# Patient Record
Sex: MALE | Race: WHITE | Employment: FULL TIME | URBAN - METROPOLITAN AREA
[De-identification: names, ages, dates, MRNs, and addresses within clinical notes are randomized per-mention and may not be internally consistent; named-entity substitution may affect disease eponyms.]

---

## 2023-01-29 ENCOUNTER — HOSPITAL ENCOUNTER (EMERGENCY)
Age: 53
Discharge: LEFT BEFORE DISCHARGE | End: 2023-01-29
Attending: EMERGENCY MEDICINE
Payer: COMMERCIAL

## 2023-01-29 ENCOUNTER — APPOINTMENT (OUTPATIENT)
Dept: GENERAL RADIOLOGY | Age: 53
End: 2023-01-29
Payer: COMMERCIAL

## 2023-01-29 VITALS
OXYGEN SATURATION: 96 % | WEIGHT: 230 LBS | RESPIRATION RATE: 14 BRPM | HEIGHT: 71 IN | DIASTOLIC BLOOD PRESSURE: 95 MMHG | TEMPERATURE: 97.8 F | BODY MASS INDEX: 32.2 KG/M2 | HEART RATE: 112 BPM | SYSTOLIC BLOOD PRESSURE: 130 MMHG

## 2023-01-29 DIAGNOSIS — A41.9 SEPTICEMIA (HCC): ICD-10-CM
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
## 2023-01-29 DIAGNOSIS — L03.012 CELLULITIS OF FINGER OF LEFT HAND: Primary | ICD-10-CM

## 2023-01-29 DIAGNOSIS — Z53.29 LEFT AGAINST MEDICAL ADVICE: ICD-10-CM

## 2023-01-29 DIAGNOSIS — D72.825 BANDEMIA: ICD-10-CM

## 2023-01-29 LAB
A/G RATIO: 1.5 (ref 1.1–2.2)
ALBUMIN SERPL-MCNC: 4.4 G/DL (ref 3.4–5)
ALP BLD-CCNC: 87 U/L (ref 40–129)
ALT SERPL-CCNC: 24 U/L (ref 10–40)
ANION GAP SERPL CALCULATED.3IONS-SCNC: 10 MMOL/L (ref 3–16)
AST SERPL-CCNC: 19 U/L (ref 15–37)
BANDED NEUTROPHILS RELATIVE PERCENT: 16 % (ref 0–7)
BASOPHILS ABSOLUTE: 0 K/UL (ref 0–0.2)
BASOPHILS RELATIVE PERCENT: 0 %
BILIRUB SERPL-MCNC: 1 MG/DL (ref 0–1)
BUN BLDV-MCNC: 9 MG/DL (ref 7–20)
C-REACTIVE PROTEIN: 203.3 MG/L (ref 0–5.1)
CALCIUM SERPL-MCNC: 9.5 MG/DL (ref 8.3–10.6)
CHLORIDE BLD-SCNC: 98 MMOL/L (ref 99–110)
CO2: 25 MMOL/L (ref 21–32)
CREAT SERPL-MCNC: 0.8 MG/DL (ref 0.9–1.3)
EOSINOPHILS ABSOLUTE: 0 K/UL (ref 0–0.6)
EOSINOPHILS RELATIVE PERCENT: 0 %
GFR SERPL CREATININE-BSD FRML MDRD: >60 ML/MIN/{1.73_M2}
GLUCOSE BLD-MCNC: 224 MG/DL (ref 70–99)
HCT VFR BLD CALC: 45.9 % (ref 40.5–52.5)
HEMOGLOBIN: 15.6 G/DL (ref 13.5–17.5)
LACTIC ACID: 1.1 MMOL/L (ref 0.4–2)
LYMPHOCYTES ABSOLUTE: 1 K/UL (ref 1–5.1)
LYMPHOCYTES RELATIVE PERCENT: 6 %
MCH RBC QN AUTO: 30.7 PG (ref 26–34)
MCHC RBC AUTO-ENTMCNC: 34 G/DL (ref 31–36)
MCV RBC AUTO: 90.3 FL (ref 80–100)
MONOCYTES ABSOLUTE: 0.8 K/UL (ref 0–1.3)
MONOCYTES RELATIVE PERCENT: 5 %
NEUTROPHILS ABSOLUTE: 14.5 K/UL (ref 1.7–7.7)
NEUTROPHILS RELATIVE PERCENT: 73 %
PDW BLD-RTO: 13.4 % (ref 12.4–15.4)
PLATELET # BLD: 106 K/UL (ref 135–450)
PMV BLD AUTO: 10.4 FL (ref 5–10.5)
POTASSIUM SERPL-SCNC: 3.8 MMOL/L (ref 3.5–5.1)
RBC # BLD: 5.08 M/UL (ref 4.2–5.9)
SEDIMENTATION RATE, ERYTHROCYTE: 41 MM/HR (ref 0–20)
SODIUM BLD-SCNC: 133 MMOL/L (ref 136–145)
TOTAL CK: 102 U/L (ref 39–308)
TOTAL PROTEIN: 7.3 G/DL (ref 6.4–8.2)
WBC # BLD: 16.3 K/UL (ref 4–11)

## 2023-01-29 PROCEDURE — 6360000002 HC RX W HCPCS: Performed by: EMERGENCY MEDICINE

## 2023-01-29 PROCEDURE — 83605 ASSAY OF LACTIC ACID: CPT

## 2023-01-29 PROCEDURE — 80053 COMPREHEN METABOLIC PANEL: CPT

## 2023-01-29 PROCEDURE — 6370000000 HC RX 637 (ALT 250 FOR IP): Performed by: EMERGENCY MEDICINE

## 2023-01-29 PROCEDURE — 2580000003 HC RX 258: Performed by: EMERGENCY MEDICINE

## 2023-01-29 PROCEDURE — 85025 COMPLETE CBC W/AUTO DIFF WBC: CPT

## 2023-01-29 PROCEDURE — 90471 IMMUNIZATION ADMIN: CPT | Performed by: EMERGENCY MEDICINE

## 2023-01-29 PROCEDURE — 85652 RBC SED RATE AUTOMATED: CPT

## 2023-01-29 PROCEDURE — 73140 X-RAY EXAM OF FINGER(S): CPT

## 2023-01-29 PROCEDURE — 99284 EMERGENCY DEPT VISIT MOD MDM: CPT

## 2023-01-29 PROCEDURE — 87040 BLOOD CULTURE FOR BACTERIA: CPT

## 2023-01-29 PROCEDURE — 2500000003 HC RX 250 WO HCPCS: Performed by: EMERGENCY MEDICINE

## 2023-01-29 PROCEDURE — 82550 ASSAY OF CK (CPK): CPT

## 2023-01-29 PROCEDURE — 26010 DRAINAGE OF FINGER ABSCESS: CPT

## 2023-01-29 PROCEDURE — 90715 TDAP VACCINE 7 YRS/> IM: CPT | Performed by: EMERGENCY MEDICINE

## 2023-01-29 PROCEDURE — 96365 THER/PROPH/DIAG IV INF INIT: CPT

## 2023-01-29 PROCEDURE — 96375 TX/PRO/DX INJ NEW DRUG ADDON: CPT

## 2023-01-29 PROCEDURE — 86140 C-REACTIVE PROTEIN: CPT

## 2023-01-29 RX ORDER — SULFAMETHOXAZOLE AND TRIMETHOPRIM 800; 160 MG/1; MG/1
1 TABLET ORAL ONCE
Status: COMPLETED | OUTPATIENT
Start: 2023-01-29 | End: 2023-01-29

## 2023-01-29 RX ORDER — ACETAMINOPHEN 325 MG/1
650 TABLET ORAL ONCE
Status: COMPLETED | OUTPATIENT
Start: 2023-01-29 | End: 2023-01-29

## 2023-01-29 RX ORDER — 0.9 % SODIUM CHLORIDE 0.9 %
1000 INTRAVENOUS SOLUTION INTRAVENOUS ONCE
Status: COMPLETED | OUTPATIENT
Start: 2023-01-29 | End: 2023-01-29

## 2023-01-29 RX ORDER — CLINDAMYCIN PHOSPHATE 600 MG/50ML
600 INJECTION INTRAVENOUS ONCE
Status: COMPLETED | OUTPATIENT
Start: 2023-01-29 | End: 2023-01-29

## 2023-01-29 RX ORDER — CLINDAMYCIN HYDROCHLORIDE 300 MG/1
300 CAPSULE ORAL 3 TIMES DAILY
Qty: 24 CAPSULE | Refills: 0 | Status: SHIPPED | OUTPATIENT
Start: 2023-01-29 | End: 2023-01-29 | Stop reason: SDUPTHER

## 2023-01-29 RX ORDER — ONDANSETRON 4 MG/1
4 TABLET, ORALLY DISINTEGRATING ORAL ONCE
Status: COMPLETED | OUTPATIENT
Start: 2023-01-29 | End: 2023-01-29

## 2023-01-29 RX ORDER — CLINDAMYCIN HYDROCHLORIDE 300 MG/1
300 CAPSULE ORAL 3 TIMES DAILY
Qty: 24 CAPSULE | Refills: 0 | Status: SHIPPED | OUTPATIENT
Start: 2023-01-29 | End: 2023-02-06

## 2023-01-29 RX ORDER — SULFAMETHOXAZOLE AND TRIMETHOPRIM 800; 160 MG/1; MG/1
1 TABLET ORAL 2 TIMES DAILY
Qty: 16 TABLET | Refills: 0 | Status: SHIPPED | OUTPATIENT
Start: 2023-01-29 | End: 2023-01-29 | Stop reason: SDUPTHER

## 2023-01-29 RX ORDER — SULFAMETHOXAZOLE AND TRIMETHOPRIM 800; 160 MG/1; MG/1
1 TABLET ORAL 2 TIMES DAILY
Qty: 16 TABLET | Refills: 0 | Status: SHIPPED | OUTPATIENT
Start: 2023-01-29 | End: 2023-02-06

## 2023-01-29 RX ORDER — KETOROLAC TROMETHAMINE 30 MG/ML
15 INJECTION, SOLUTION INTRAMUSCULAR; INTRAVENOUS ONCE
Status: COMPLETED | OUTPATIENT
Start: 2023-01-29 | End: 2023-01-29

## 2023-01-29 RX ADMIN — SODIUM CHLORIDE 1000 ML: 9 INJECTION, SOLUTION INTRAVENOUS at 06:55

## 2023-01-29 RX ADMIN — CLINDAMYCIN PHOSPHATE 600 MG: 600 INJECTION, SOLUTION INTRAVENOUS at 06:58

## 2023-01-29 RX ADMIN — ONDANSETRON 4 MG: 4 TABLET, ORALLY DISINTEGRATING ORAL at 06:55

## 2023-01-29 RX ADMIN — KETOROLAC TROMETHAMINE 15 MG: 30 INJECTION, SOLUTION INTRAMUSCULAR at 06:56

## 2023-01-29 RX ADMIN — ACETAMINOPHEN 325MG 650 MG: 325 TABLET ORAL at 06:58

## 2023-01-29 RX ADMIN — SULFAMETHOXAZOLE AND TRIMETHOPRIM 1 TABLET: 800; 160 TABLET ORAL at 06:58

## 2023-01-29 RX ADMIN — TETANUS TOXOID, REDUCED DIPHTHERIA TOXOID AND ACELLULAR PERTUSSIS VACCINE, ADSORBED 0.5 ML: 5; 2.5; 8; 8; 2.5 SUSPENSION INTRAMUSCULAR at 06:59

## 2023-01-29 ASSESSMENT — PAIN DESCRIPTION - LOCATION
LOCATION: HAND
LOCATION: HAND

## 2023-01-29 ASSESSMENT — PAIN SCALES - GENERAL
PAINLEVEL_OUTOF10: 4
PAINLEVEL_OUTOF10: 7
PAINLEVEL_OUTOF10: 6

## 2023-01-29 ASSESSMENT — PAIN DESCRIPTION - ORIENTATION
ORIENTATION: LEFT
ORIENTATION: LEFT

## 2023-01-29 ASSESSMENT — PAIN DESCRIPTION - DESCRIPTORS
DESCRIPTORS: ACHING

## 2023-01-29 ASSESSMENT — PAIN - FUNCTIONAL ASSESSMENT
PAIN_FUNCTIONAL_ASSESSMENT: 0-10
PAIN_FUNCTIONAL_ASSESSMENT: 0-10

## 2023-01-29 NOTE — DISCHARGE INSTRUCTIONS
We did recommend admission but at this time you are refusing. If you change your mind or have any worsening symptoms with redness spreading, severe pain with any movement of the finger, fever, confusion, or any other concerns over the next 6 to 24 hours, then return immediately to the ED for repeat assessment. Otherwise, make sure to follow-up with orthopedics in the next 1 to 2 days for repeat evaluation and call primary doctor for any other concerns. Do not return to work until cleared by orthopedics. Take Bactrim and clindamycin as prescribed due to concern for infection. Take Tylenol or Motrin as needed for pain. Drink plenty of fluids.

## 2023-01-29 NOTE — Clinical Note
Stephanie Giordano was seen and treated in our emergency department on 1/29/2023. He may return to work on 02/01/2023. Patient will need clearance by orthopedics prior to returning to work due to concern for left middle finger infection     If you have any questions or concerns, please don't hesitate to call.       Armand Barboza MD

## 2023-01-29 NOTE — CONSULTS
65 - PS to Dr Rena Puckett at [facility] ortho  Re: left middle finger infection  0827 - Dr Rena Puckett called back to speak with Dr Miryam Bowman

## 2023-01-29 NOTE — ED NOTES
Pt signed out [place] after speaking with Dr Eneida Campos. Pt signed AMA form, RN reviewed AVS with pt and handed him 2 paper prescriptions. Pt denies concerns or questions at this time.       Margot Rose RN  01/29/23 1014

## 2023-01-29 NOTE — ED NOTES
Bacitracin, Adaptic, Gauze 4 by 4 pads, and gauze stretch wrap applied to injured finger.      Lynda Eastman RN  01/29/23 9856

## 2023-01-29 NOTE — ED PROVIDER NOTES
[facility]  ED  EMERGENCY DEPARTMENT ENCOUNTER        Pt Name: Mary Royal  MRN: [id number]  [place] 1970  Date of evaluation: 1/29/2023  Provider: Dano Serrano MD  PCP: No primary care provider on file. CHIEF COMPLAINT       Chief Complaint   Patient presents with    Finger Injury     L middle finger, happened at work on Weds, red, swollen, 7/10. HISTORY OFPRESENT ILLNESS   (Location/Symptom, Timing/Onset, Context/Setting, Quality, Duration, Modifying Factors,Severity)  Note limiting factors. Mary Royal is a 45 y.o. male presenting today due to concern for sustaining a small cut to the ulnar aspect of his left distal middle finger 3 days ago on Thursday at work and stating that it seemed to be a very small laceration with nothing to worry about until he started developing pain this morning when he woke up associated with swelling and redness. He does report still being able to move the finger but does have some discomfort with doing so. He does report some pain to the left hand and a slight discomfort in his left forearm as well. He is unsure of his last tetanus shot. He is right-handed. No chest pain or shortness of breath. No nausea or vomiting. No trouble moving the wrist or elbow. Due to concern for worsening left middle finger pain, he came to the emergency department for antibiotics. He did drive here. He denies any numbness or weakness in the hand other than in regards to discomfort with moving the left middle finger. REVIEW OF SYSTEMS    (2-9 systems for level 4, 10 or more for level 5)     Review of Systems      Positives and Pertinent negatives as per HPI. PASTMEDICAL HISTORY   History reviewed. No pertinent past medical history.       SURGICAL HISTORY       Past Surgical History:   Procedure Laterality Date    MOUTH SURGERY  2006         CURRENT MEDICATIONS       Discharge Medication List as of 1/29/2023  9:59 AM ALLERGIES     Patient has no known allergies. FAMILY HISTORY     History reviewed. No pertinent family history. SOCIAL HISTORY       Social History     Socioeconomic History    Marital status: Single     Spouse name: None    Number of children: None    Years of education: None    Highest education level: None   Tobacco Use    Smoking status: Never    Smokeless tobacco: Never   Vaping Use    Vaping Use: Never used   Substance and Sexual Activity    Alcohol use: Yes     Comment: occ    Drug use: Yes     Types: Marijuana (Weed)       SCREENINGS    [place] Coma Scale  Eye Opening: Spontaneous  Best Verbal Response: Oriented  Best Motor Response: Obeys commands  [place] Coma Scale Score: 15           PHYSICAL EXAM    (up to 7 for level 4, 8 or more for level 5)     ED Triage Vitals [01/29/23 0548]   BP Temp Temp Source Heart Rate Resp SpO2 Height Weight   (!) 165/109 97.8 °F (36.6 °C) Oral (!) 115 18 93 % 5' 11\" (1.803 m) 230 lb (104.3 kg)       Physical Exam  Vitals and nursing note reviewed. Constitutional:       General: He is awake. He is not in acute distress. Appearance: Normal appearance. He is well-developed and well-groomed. He is obese. He is not ill-appearing, toxic-appearing or diaphoretic. HENT:      Head: Normocephalic and atraumatic. Mouth/Throat:      Mouth: Mucous membranes are moist.   Cardiovascular:      Rate and Rhythm: Regular rhythm. Tachycardia present. Pulses: Normal pulses. Radial pulses are 2+ on the right side and 2+ on the left side. Pulmonary:      Effort: Pulmonary effort is normal. No respiratory distress. Breath sounds: No stridor. Musculoskeletal:         General: Swelling (left middle finger and base of left middle finger), tenderness (left middle finger and distal left hand on the dorsum) and signs of injury (superficial laceration noted to ulnar aspect of left middle finger) present. No deformity.       Right forearm: Normal. Left forearm: Normal. No swelling, edema, deformity, lacerations, tenderness or bony tenderness. Right wrist: Normal.      Left wrist: Normal. No swelling, deformity, effusion, lacerations, tenderness, bony tenderness, snuff box tenderness or crepitus. Normal range of motion. Normal pulse. Right hand: No swelling, deformity, lacerations, tenderness or bony tenderness. Normal range of motion. Normal strength. Normal sensation. Left hand: Swelling (swelling to left middle finger), laceration (superficial, nothing needing repair at this time to left middle finger) and tenderness present. No deformity or bony tenderness. Decreased range of motion (minimally to left middle finger, no pain with passive extension/flexion noted on exam). Normal strength. Normal sensation. Normal capillary refill. Hands:       Cervical back: Neck supple. Skin:     General: Skin is warm and dry. Capillary Refill: Capillary refill takes less than 2 seconds. Left middle finger     Coloration: Skin is not jaundiced. Findings: Erythema present. No bruising. Neurological:      General: No focal deficit present. Mental Status: He is alert and oriented to person, place, and time. Mental status is at baseline. Sensory: No sensory deficit. Motor: No weakness. Psychiatric:         Attention and Perception: Attention normal.         Mood and Affect: Affect normal. Mood is anxious. Speech: Speech normal. Speech is not delayed or slurred. Behavior: Behavior normal. Behavior is cooperative.            DIAGNOSTIC RESULTS   :    Labs Reviewed   CBC WITH AUTO DIFFERENTIAL - Abnormal; Notable for the following components:       Result Value    WBC 16.3 (*)     Platelets 122 (*)     Neutrophils Absolute 14.5 (*)     Bands Relative 16 (*)     All other components within normal limits   COMPREHENSIVE METABOLIC PANEL - Abnormal; Notable for the following components:    Sodium 133 (*)     Chloride 98 (*)     Glucose 224 (*)     Creatinine 0.8 (*)     All other components within normal limits   SEDIMENTATION RATE - Abnormal; Notable for the following components:    Sed Rate 41 (*)     All other components within normal limits   C-REACTIVE PROTEIN - Abnormal; Notable for the following components:    .3 (*)     All other components within normal limits   CULTURE, BLOOD 1    Narrative:     ORDER#: [id number]                          ORDERED BY: Matt DE LA ROSA Payer  SOURCE: Blood                              COLLECTED:  01/29/23 06:50  ANTIBIOTICS AT OMAR.:                      RECEIVED :  01/29/23 16:57  If child <=2 yrs old please draw pediatric bottle. ~Blood Culture 1   CULTURE, BLOOD 2    Narrative:     ORDER#: [id number]                          ORDERED BY: Matt DE LA ROSA Payer  SOURCE: Blood Antecubital-Rig              COLLECTED:  01/29/23 06:52  ANTIBIOTICS AT OMAR.:                      RECEIVED :  01/29/23 16:57  If child <=2 yrs old please draw pediatric bottle. ~Blood Culture #2   LACTIC ACID   CK       All other labs were within normal range or not returned asof this dictation. EKG: All EKG's are interpreted by the Emergency Department Physician who either signs or Co-signs this chart in the absence of a cardiologist.        RADIOLOGY:   Non-plain film images such as CT, Ultrasound and MRI are read by the radiologist. Janit Canny images are visualized and preliminarily interpreted by the  ED Provider with the belowfindings:    I did review the finger x-ray and did not see any gas or obvious signs of bony involvement, I also reviewed radiologist's report. Interpretation per the Radiologist below, if available at the time of this note:    XR FINGER LEFT (MIN 2 VIEWS)   Final Result   No acute osseous abnormality.                PROCEDURES   Unless otherwise noted below, none     Incision/Drainage    Date/Time: 1/30/2023 11:12 PM  Performed by: Tod Cheung MD  Authorized by: Tod Cheung MD Consent:     Consent obtained:  Verbal    Consent given by:  Patient    Risks, benefits, and alternatives were discussed: yes      Risks discussed:  Bleeding, incomplete drainage, pain and infection    Alternatives discussed:  No treatment, referral and alternative treatment  Universal protocol:     Procedure explained and questions answered to patient or proxy's satisfaction: yes      Patient identity confirmed:  Verbally with patient  Location:     Type:  Abscess    Size:  1 cm    Location:  Upper extremity    Upper extremity location:  Finger    Finger location:  L long finger (ulnar aspect of left middle finger close to DIP region)  Pre-procedure details:     Skin preparation:  Povidone-iodine  Sedation:     Sedation type:  None  Anesthesia:     Anesthesia method:  Local infiltration    Local anesthetic:  Lidocaine 1% w/o epi  Procedure type:     Complexity:  Simple  Procedure details:     Ultrasound guidance: no      Needle aspiration: no      Incision types:  Single straight    Incision depth:  Dermal    Wound management:  Probed and deloculated    Drainage:  Purulent and bloody    Drainage amount:  Scant    Wound treatment:  Wound left open    Packing materials:  None  Post-procedure details:     Procedure completion:  Tolerated well, no immediate complications    CRITICAL CARE TIME   Critical Care Time:    I personally saw the patient and independently provided 40 minutes of non-concurrent critical care out of the total shared critical care time provided. Includes repeat examinations, speaking with consultants, lab and x-ray interpretation, charting, treating for severe finger infection needing IV antibiotics    Excludes separate billable procedures. Patient at risk for serious decompensation if not treated for this life-threatening/limb-threatening condition.             CONSULTS: Spoke with Dr. Yobani De Leon and he did review the patient's chart and imaging that the patient did give verbal consent for me to take a picture of his hand and ultimately stated that incision and drainage was warranted but no need for him to come in at this point since he does have good range of motion of the finger and hand and does not suspect tenosynovitis but would be happy to see the patient if he does agree to be admitted but otherwise would agree to have the patient be seen urgently in the office in the next 1 to 2 days by orthopedics. He recommended soaking the hand twice a day in soapy water along with using a pancake splint for comfort and elevate the arm when possible by the patient if he does decide to leave [address] IP CONSULT TO ORTHOPEDIC SURGERY    EMERGENCY DEPARTMENT COURSE and DIFFERENTIAL DIAGNOSIS/MDM:   Vitals:    Vitals:    01/29/23 0753 01/29/23 0823 01/29/23 0853 01/29/23 1010   BP: (!) 146/94 (!) 155/92 139/85 (!) 130/95   Pulse: 95 (!) 107 (!) 112 (!) 112   Resp: 16   14   Temp:    97.8 °F (36.6 °C)   TempSrc:    Oral   SpO2: 96% 95% 95% 96%   Weight:       Height:           Patient was given the following medications:  Medications   0.9 % sodium chloride bolus (0 mLs IntraVENous Stopped 1/29/23 0803)   clindamycin (CLEOCIN) 600 mg in dextrose 5 % 50 mL IVPB (0 mg IntraVENous Stopped 1/29/23 0728)   sulfamethoxazole-trimethoprim (BACTRIM DS;SEPTRA DS) 800-160 MG per tablet 1 tablet (1 tablet Oral Given 1/29/23 0658)   tetanus-diphth-acell pertussis (BOOSTRIX) injection 0.5 mL (0.5 mLs IntraMUSCular Given 1/29/23 0659)   ketorolac (TORADOL) injection 15 mg (15 mg IntraVENous Given 1/29/23 0656)   acetaminophen (TYLENOL) tablet 650 mg (650 mg Oral Given 1/29/23 0658)   ondansetron (ZOFRAN-ODT) disintegrating tablet 4 mg (4 mg Oral Given 1/29/23 1610)     Patient evaluated due to work-related injury in which point he had a superficial laceration to his left middle finger and now is complaining of pain starting this morning with erythema and swelling spreading towards the hand.   I am considering abscess, tenosynovitis, septic arthritis, cellulitis, sepsis, necrotizing fasciitis, amongst other pathology. He denies any chest pain or shortness of breath and even with the tachycardia, I do believe this is more related to anxiety along with pain and infection and do not suspect pulmonary embolism. He did drive and therefore I did try IV Toradol for the discomfort along with oral Tylenol. He received oral Bactrim along with IV clindamycin as well to see if this helps with his swelling and signs of infection since he reports he does not want to be admitted. Tdap was updated. Upon repeat evaluation after antibiotics, he did report that he was having him provement with his pain and able to move his finge with greater ease and still had good range of motion of all the joints of the left middle finger along with the left hand and wrist.  I did highly recommend admission with IV antibiotics due to concerning initial evaluation but he was declining admission. He had full mental capacity to make his own medical decisions and was alert and oriented x4. He is aware that if this worsens, he could ultimately lose a finger or even more of his hand or arm that could lead to permanent disability or even death if he is septic. Even with this knowledge, he was refusing admission and wanted to go home. He promises to return to the emergency department immediately for any worsening symptoms but otherwise agrees to follow-up with orthopedics within the next 1 to 2 days to ensure the infection is improving. He was well-appearing and in no acute distress but ultimately signed out [address] after informed discussion. I did prescribe him oral clindamycin and oral Bactrim to double cover him to hopefully avoid this from getting any worse, especially since he did report some improvement of his symptoms while in the emergency department following these antibiotics.   He did report this happened at work although at this time is not filing Worker's Compensation form. I was the primary provider for the patient. The patient tolerated their visit well. The patient and / or the family were informed of the results of any tests, a time was given to answer questions. FINAL IMPRESSION      1. Cellulitis of finger of left hand    2. Bandemia    3. Septicemia ([address]    4. Left against medical advice    5.  Work related injury          [address] [place] [address] 01/29/2023 09:54:45 AM      PATIENT REFERRED TO:  [facility]  ED  [address] [facility] [address] [facility]  Go to   If symptoms worsen    [facility]  [phone]  In 2 days      [address]  [phone]  Schedule an appointment as soon as possible for a visit in 1 day  For repeat evaluation with orthopedics in 1-2 days, call first thing in the morning tomorrow    DISCHARGEMEDICATIONS:  Discharge Medication List as of 1/29/2023  9:59 AM        START taking these medications    Details   clindamycin (CLEOCIN) 300 MG capsule Take 1 capsule by mouth 3 times daily for 8 days, Disp-24 capsule, R-0Print      sulfamethoxazole-trimethoprim (BACTRIM DS;SEPTRA DS) 800-160 MG per tablet Take 1 tablet by mouth 2 times daily for 8 days, Disp-16 tablet, R-0Print             DISCONTINUED MEDICATIONS:  Discharge Medication List as of 1/29/2023  9:59 AM                 (Please note that portions of this note were completed with a voicerecognition program.  Efforts were made to edit the dictations but occasionally words are mis-transcribed.)    Abigail Garcias MD (electronically signed)            Abigail Garcias MD  01/30/23 6083

## 2023-01-30 ENCOUNTER — TELEPHONE (OUTPATIENT)
Dept: ORTHOPEDIC SURGERY | Age: 53
End: 2023-01-30

## 2023-01-30 NOTE — TELEPHONE ENCOUNTER
Appointment Request     Patient requesting earlier appointment: Yes  Appointment offered to patient: 2/1/2023  Patient Contact Number: [phone]    Would prefer And location.

## 2023-01-30 NOTE — TELEPHONE ENCOUNTER
Tried to call patient back to explain [facility] schedule is full. No answer, VMB not set up. Patient already scheduled for an appointment this week.

## 2023-02-01 ENCOUNTER — OFFICE VISIT (OUTPATIENT)
Dept: ORTHOPEDIC SURGERY | Age: 53
End: 2023-02-01

## 2023-02-01 VITALS — WEIGHT: 230 LBS | BODY MASS INDEX: 32.2 KG/M2 | HEIGHT: 71 IN

## 2023-02-01 DIAGNOSIS — L08.9 FINGER INFECTION: Primary | ICD-10-CM

## 2023-02-01 PROCEDURE — 99203 OFFICE O/P NEW LOW 30 MIN: CPT | Performed by: ORTHOPAEDIC SURGERY

## 2023-02-01 RX ORDER — SULFAMETHOXAZOLE AND TRIMETHOPRIM 800; 160 MG/1; MG/1
1 TABLET ORAL 2 TIMES DAILY
Qty: 10 TABLET | Refills: 0 | Status: SHIPPED | OUTPATIENT
Start: 2023-02-01 | End: 2023-02-06

## 2023-02-01 RX ORDER — CLINDAMYCIN HYDROCHLORIDE 300 MG/1
300 CAPSULE ORAL 4 TIMES DAILY
Qty: 20 CAPSULE | Refills: 0 | Status: SHIPPED | OUTPATIENT
Start: 2023-02-01 | End: 2023-02-06

## 2023-02-01 NOTE — PROGRESS NOTES
CHIEF COMPLAINT: Left hand pain/ long (middle) finger infection. DATE OF INJURY: 1/26/2023    HISTORY:  Mr. Rosado Pain 45 y.o.  male right handed presents today for the first visit for evaluation of a left hand injury which occurred when he cut his hand on 1/26/2023 and 2 days later got worse. He is complaining of long (middle) finger pain and swelling. This is better with elevation and worse with ROM. The pain is sharp and not radiating and improving. No other complaint. He was seen at [facility] on 1/29/2023, was evaluated had IV ABX, then sent home on Clindamycin and Bactrim and asked to f/u with me. He works in construction. No past medical history on file. Past Surgical History:   Procedure Laterality Date    MOUTH SURGERY  2006       Social History     Socioeconomic History    Marital status: Single     Spouse name: Not on file    Number of children: Not on file    Years of education: Not on file    Highest education level: Not on file   Occupational History    Not on file   Tobacco Use    Smoking status: Never    Smokeless tobacco: Never   Vaping Use    Vaping Use: Never used   Substance and Sexual Activity    Alcohol use: Yes     Comment: occ    Drug use: Yes     Types: Marijuana [place] Veronika)    Sexual activity: Not on file   Other Topics Concern    Not on file   Social History Narrative    Not on file     Social Determinants of Health     Financial Resource Strain: Not on file   Food Insecurity: Not on file   Transportation Needs: Not on file   Physical Activity: Not on file   Stress: Not on file   Social Connections: Not on file   Intimate Partner Violence: Not on file   Housing Stability: Not on file       No family history on file.     Current Outpatient Medications on File Prior to Visit   Medication Sig Dispense Refill    clindamycin (CLEOCIN) 300 MG capsule Take 1 capsule by mouth 3 times daily for 8 days 24 capsule 0    sulfamethoxazole-trimethoprim (BACTRIM DS;SEPTRA DS) 800-160 MG per tablet Take 1 tablet by mouth 2 times daily for 8 days 16 tablet 0     No current facility-administered medications on file prior to visit. Pertinent items are noted in HPI  Review of systems reviewed from Patient History Form and available in the patient's chart under the Media tab. No change noted. PHYSICAL EXAMINATION:  Mr. Carlos Fajardo is a very pleasant 45 y.o.  male who presents today in no acute distress, awake, alert, and oriented. He is well dressed, nourished and  groomed. Patient with normal affect. Height is  5' 11\" (1.803 m), weight is 230 lb (104.3 kg), Body mass index is 32.08 kg/m². Resting respiratory rate is 16. Examination of the gait, showed that the patient walks with no limp . Examination of both Upper extremities showing a decreased range of motion of the left long (middle) finger compare to the other side. There is moderate swelling that can be seen, as well as erythema and ulnar wound of the long (middle) finger. He has intact sensation and good radial pulses. He has mild tenderness on deep palpation over the middle phalanx of the long (middle) finger left hand. There is no palmar tenderness of the left long (middle) finger. IMAGING: Kaylih Jamia were reviewed, dated 1/29/2023,  3 views of the left hand, and showed no fracture or FB. IMPRESSION:Left hand pain/ long (middle) finger infection. PLAN:   I discussed with Dwain Faith the treatment options including both surgical and non-surgical treatment, and that my recommendation is continue PO ABX as he is improving, Rx sent. Wound care daily left hand. F/U in one week.       Jill Zendejas MD

## 2023-02-01 NOTE — LETTER
[facility]  [address] [address]  Phone: [phone]  Fax: [phone]    Sylvie Devries MD        February 1, 2023     Patient: Marc Faith   YOB: 1970   Date of Visit: 2/1/2023       To Whom It May Concern: It is my medical opinion that Lokesh Ochoa may return to work on 2/7/2023. If you have any questions or concerns, please don't hesitate to call.     Sincerely,        Sylvie Devries MD

## 2023-02-01 NOTE — LETTER
[facility]  [facility] [address] [address]  Phone: [phone]  Fax: [phone]    Milton Couch MD        February 1, 2023     Patient: Evelyn Faith   YOB: 1970   Date of Visit: 2/1/2023       To Whom It May Concern: It is my medical opinion that Leni Rinaldi may return to work on 2/8/2023. If you have any questions or concerns, please don't hesitate to call.     Sincerely,        Milton Couch MD

## 2023-02-02 LAB
BLOOD CULTURE, ROUTINE: NORMAL
CULTURE, BLOOD 2: NORMAL

## 2023-02-07 ENCOUNTER — OFFICE VISIT (OUTPATIENT)
Dept: ORTHOPEDIC SURGERY | Age: 53
End: 2023-02-07

## 2023-02-07 VITALS — HEIGHT: 71 IN | WEIGHT: 230 LBS | BODY MASS INDEX: 32.2 KG/M2

## 2023-02-07 DIAGNOSIS — L08.9 FINGER INFECTION: Primary | ICD-10-CM

## 2023-02-07 NOTE — LETTER
[address] [place] [address] Dr Kyle Leyva [facility] [address]  Phone: [phone]  Fax: [phone] Raiza Winn MD        February 7, 2023     Patient: Oleksandr Faith   YOB: 1970   Date of Visit: 2/7/2023       To Whom It May Concern: It is my medical opinion that Bhupinder Sweeney may return to work on 2/8/23 full duty. If you have any questions or concerns, please don't hesitate to call.     Sincerely,        Anthony Bassett MD

## 2023-02-08 NOTE — PROGRESS NOTES
CHIEF COMPLAINT: Left hand pain/ long (middle) finger infection. DATE OF INJURY: 1/26/2023    HISTORY:  Mr. Ayaz Kathleen 45 y.o.  male right handed presents today for f/u evaluation of a left hand injury which occurred when he cut his hand on 1/26/2023 and 2 days later got worse. He is complaining of long (middle) finger pain and swelling, 1/10. This is better with elevation and worse with ROM. The pain is sharp and not radiating and improving. No other complaint. He was seen at [facility] on 1/29/2023, was evaluated had IV ABX, then sent home on Clindamycin and Bactrim and asked to f/u with me. 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 No past medical history on file. Past Surgical History:   Procedure Laterality Date    MOUTH SURGERY  2006       Social History     Socioeconomic History    Marital status: Single     Spouse name: Not on file    Number of children: Not on file    Years of education: Not on file    Highest education level: Not on file   Occupational History    Not on file   Tobacco Use    Smoking status: Never    Smokeless tobacco: Never   Vaping Use    Vaping Use: Never used   Substance and Sexual Activity    Alcohol use: Yes     Comment: occ    Drug use: Yes     Types: Marijuana Harper Sarah)    Sexual activity: Not on file   Other Topics Concern    Not on file   Social History Narrative    Not on file     Social Determinants of Health     Financial Resource Strain: Not on file   Food Insecurity: Not on file   Transportation Needs: Not on file   Physical Activity: Not on file   Stress: Not on file   Social Connections: Not on file   Intimate Partner Violence: Not on file   Housing Stability: Not on file       No family history on file. No current outpatient medications on file prior to visit. No current facility-administered medications on file prior to visit.        Pertinent items are noted in HPI  Review of systems reviewed from Patient History Form and available in the patient's chart under the Media tab. No change noted. PHYSICAL EXAMINATION:  Mr. Irma Juárez is a very pleasant 45 y.o.  male who presents today in no acute distress, awake, alert, and oriented. He is well dressed, nourished and  groomed. Patient with normal affect. Height is  5' 11\" (1.803 m), weight is 230 lb (104.3 kg), Body mass index is 32.08 kg/m². Resting respiratory rate is 16. Examination of the gait, showed that the patient walks with no limp . Examination of both Upper extremities showing a decreased range of motion of the left long (middle) finger compare to the other side. There is mild swelling that can be seen, minimal erythema and ulnar wound of the long (middle) finger. He has intact sensation and good radial pulses. He has minimal tenderness on deep palpation over the middle phalanx of the long (middle) finger left hand. There is no palmar tenderness of the left long (middle) finger. IMAGING: [facility] were reviewed, dated 1/29/2023,  3 views of the left hand, and showed no fracture or FB. IMPRESSION:Left hand pain/ long (middle) finger infection. PLAN:   I discussed with Marj Faith the treatment options including both surgical and non-surgical treatment, and that my recommendation is continue Wound care daily left hand and ROM. NSAIDs OTC. I discussed with the patient that I think that he would really benefit from a course of physical therapy for further strengthening and stretching. He would like to do it on his own. F/U in 2 weeks. He can go back to work tomorrow.       Neil Quintana MD